# Patient Record
Sex: MALE | Race: BLACK OR AFRICAN AMERICAN | Employment: FULL TIME | URBAN - METROPOLITAN AREA
[De-identification: names, ages, dates, MRNs, and addresses within clinical notes are randomized per-mention and may not be internally consistent; named-entity substitution may affect disease eponyms.]

---

## 2020-03-16 ENCOUNTER — OFFICE VISIT (OUTPATIENT)
Dept: URGENT CARE | Facility: URGENT CARE | Age: 25
End: 2020-03-16

## 2020-03-16 VITALS
TEMPERATURE: 98.9 F | SYSTOLIC BLOOD PRESSURE: 132 MMHG | DIASTOLIC BLOOD PRESSURE: 71 MMHG | RESPIRATION RATE: 16 BRPM | OXYGEN SATURATION: 100 % | WEIGHT: 141.8 LBS | HEART RATE: 87 BPM

## 2020-03-16 DIAGNOSIS — J02.9 VIRAL PHARYNGITIS: Primary | ICD-10-CM

## 2020-03-16 PROCEDURE — 99203 OFFICE O/P NEW LOW 30 MIN: CPT | Performed by: FAMILY MEDICINE

## 2020-03-16 ASSESSMENT — ENCOUNTER SYMPTOMS
DIAPHORESIS: 0
CHILLS: 0
SORE THROAT: 1
DIARRHEA: 0
COUGH: 0
RHINORRHEA: 0
SHORTNESS OF BREATH: 0
FEVER: 0
VOMITING: 0
NAUSEA: 0

## 2020-03-16 NOTE — LETTER
[address] [address]  Phone: [phone]    March 16, 2020        Anders Nicole  [address] [place] [address] DR NAVNEET SAINILYN [facility] [address]          To whom it may concern:    RE: Anders Nicole    Patient was seen and treated today at our clinic and missed work and may return to work tomorrow as his sx improve without restrictions. He will return to the clinic for further evaluation if his symptoms persist or worsen.      Sincerely,        Lonny Bose MD

## 2020-03-16 NOTE — PROGRESS NOTES
SUBJECTIVE:   Anders Nicole is a 24 year old male presenting with a chief complaint of   Chief Complaint   Patient presents with     Pharyngitis     Started last night        He is a new patient of [facility].      Sore throat over the past 16 hours. 4/10 pain   denies any known strep exposure.       PMH   None   Does not attend primary care      Denies travel     Review of Systems   Constitutional: Negative for chills, diaphoresis and fever.   HENT: Positive for sore throat (4/10 pain ). Negative for congestion, ear pain and rhinorrhea.    Respiratory: Negative for cough and shortness of breath.    Gastrointestinal: Negative for diarrhea, nausea and vomiting.       History reviewed. No pertinent past medical history.  History reviewed. No pertinent family history.  No current outpatient medications on file.     Social History     Tobacco Use     Smoking status: Current Every Day Smoker     Types: Cigarettes     Smokeless tobacco: Never Used   Substance Use Topics     Alcohol use: Not on file       OBJECTIVE  /71   Pulse 87   Temp 98.9  F (37.2  C) (Oral)   Resp 16   Wt 64.3 kg (141 lb 12.8 oz)   SpO2 100%     Physical Exam  Vitals signs reviewed.   HENT:      Head: Normocephalic and atraumatic.      Right Ear: External ear normal.      Left Ear: External ear normal.      Nose: Nose normal.      Mouth/Throat:      Pharynx: No oropharyngeal exudate.   Eyes:      General: No scleral icterus.        Right eye: No discharge.         Left eye: No discharge.      Conjunctiva/sclera: Conjunctivae normal.      Pupils: Pupils are equal, round, and reactive to light.   Neck:      Musculoskeletal: Neck supple.      Thyroid: No thyromegaly.      Trachea: No tracheal deviation.   Cardiovascular:      Rate and Rhythm: Normal rate and regular rhythm.      Heart sounds: Normal heart sounds. No murmur. No friction rub. No gallop.    Pulmonary:      Effort: Pulmonary effort is normal.      Breath sounds: Normal breath  sounds.   Abdominal:      Palpations: Abdomen is soft.   Musculoskeletal:         General: No tenderness or deformity.   Lymphadenopathy:      Cervical: No cervical adenopathy.   Skin:     General: Skin is warm and dry.      Capillary Refill: Capillary refill takes less than 2 seconds.      Findings: No erythema or rash.   Neurological:      Mental Status: He is alert and oriented to person, place, and time.      Cranial Nerves: No cranial nerve deficit.   Psychiatric:         Judgment: Judgment normal.         ICD-10-CM    1. Viral pharyngitis  J02.9         PLAN:  He will return to the clinic if worsening to consider screening or testing for strep   Not indicated today.   The patient indicates understanding of these issues and agrees with the plan.   Patient educational/instructional material provided including reasons for follow-up   Lonny Bose MD